# Patient Record
(demographics unavailable — no encounter records)

---

## 2025-01-22 NOTE — DISCUSSION/SUMMARY
[FreeTextEntry1] : Complete resolution of otitis media No effusions Mobile tympanic membranes RTO PRN advised on signs and symptoms requiring re evaluation and concern.  Fifth's disease is caused by Parvovirus. In general it is a benign condition. Children are contagious 1-2 weeks prior to rash when they may present with a cold or low grade fever. The rash may recur for up up to 6 weeks and extremes in temperature bring it out. Occasionally, adults may get 5th disease but manifest with arthalgias/arthritis. Women who are pregnant in first trimester and cannot recall having this, should consult their ob/gyn.

## 2025-01-22 NOTE — HISTORY OF PRESENT ILLNESS
[de-identified] : rash [FreeTextEntry6] : 4 year old with facial rash x 1 day no fever s/p URI last week now rash to arms  check effusions

## 2025-06-10 NOTE — HISTORY OF PRESENT ILLNESS
[whole ___ oz/d] : consumes [unfilled] oz of whole cow's milk per day [Influenza] : Influenza [FreeTextEntry7] : vaccine delays [FreeTextEntry1] : doing well

## 2025-06-10 NOTE — DISCUSSION/SUMMARY
[FreeTextEntry1] : Discussed safety/feeding/sleep as appropriate for age.  Time allowed for questions and all answered with understanding.  Developmental screening Tool reviewed and discussed with parent. The child is developing normally. There are no delays in speech, gross motor, fine motor and socialization skills. SWYC for age  TB risk assessment completed - no risk for TB. PPD not required.  CBC + iron studies  Vaccines  needs Varivax  Recommended yearly flu vaccine, discussed benefits of vaccine, side effects of the vaccine, and risks of not receiving the vaccine including the increased risk of linda the illness, increased risks of secondary infections, hospitalizations, and even death.  Continue balanced diet with all food groups. Brush teeth twice a day with toothbrush. Recommend visit to dentist. As per car seat 's guidelines, use foward-facing booster seat until child reaches highest weight/height for seat. Child needs to ride in a belt-positioning booster seat until  4 feet 9 inches has been reached and are between 8 and 12 years of age. Put child to sleep in own bed. Help child to maintain consistent daily routines and sleep schedule.  discussed. Ensure home is safe. Teach child about personal safety. Use consistent, positive discipline. Read aloud to child. Limit screen time to no more than 2 hours per day. Return 1 year for routine well child check.